# Patient Record
Sex: FEMALE | Race: WHITE | NOT HISPANIC OR LATINO | Employment: PART TIME | URBAN - METROPOLITAN AREA
[De-identification: names, ages, dates, MRNs, and addresses within clinical notes are randomized per-mention and may not be internally consistent; named-entity substitution may affect disease eponyms.]

---

## 2017-03-08 ENCOUNTER — WALK IN (OUTPATIENT)
Dept: URGENT CARE | Age: 27
End: 2017-03-08

## 2017-03-08 VITALS
DIASTOLIC BLOOD PRESSURE: 68 MMHG | HEART RATE: 78 BPM | TEMPERATURE: 98.6 F | SYSTOLIC BLOOD PRESSURE: 114 MMHG | WEIGHT: 196 LBS | RESPIRATION RATE: 18 BRPM

## 2017-03-08 DIAGNOSIS — R52 BODY ACHES: ICD-10-CM

## 2017-03-08 DIAGNOSIS — J02.9 SORE THROAT: ICD-10-CM

## 2017-03-08 DIAGNOSIS — R68.89 FLU-LIKE SYMPTOMS: Primary | ICD-10-CM

## 2017-03-08 PROCEDURE — 99203 OFFICE O/P NEW LOW 30 MIN: CPT | Performed by: INTERNAL MEDICINE

## 2017-03-08 RX ORDER — OSELTAMIVIR PHOSPHATE 75 MG/1
75 CAPSULE ORAL 2 TIMES DAILY
Qty: 10 CAPSULE | Refills: 0 | Status: SHIPPED | OUTPATIENT
Start: 2017-03-08

## 2017-06-16 ENCOUNTER — TELEPHONE (OUTPATIENT)
Dept: OBGYN CLINIC | Facility: CLINIC | Age: 27
End: 2017-06-16

## 2017-06-19 RX ORDER — NORGESTIMATE AND ETHINYL ESTRADIOL 0.25-0.035
1 KIT ORAL DAILY
Qty: 1 PACKAGE | Refills: 4 | Status: SHIPPED | OUTPATIENT
Start: 2017-06-19 | End: 2017-06-28

## 2017-06-28 RX ORDER — NORGESTIMATE AND ETHINYL ESTRADIOL 0.25-0.035
1 KIT ORAL DAILY
Qty: 1 PACKAGE | Refills: 4 | Status: SHIPPED | OUTPATIENT
Start: 2017-06-28 | End: 2018-01-29

## 2017-06-30 NOTE — TELEPHONE ENCOUNTER
Mailbox full, 5 packs of sprintec sent to pharm to get pt through until november annual. Caroline jimenes d/c'dennise.
PT NOTIFIED RX WAS SENT TO NEW PHARMACY. LEFT VOICE MAIL MESSAGE FOR THE LOCAL PHARMACY TO CANCEL RX.
PT REQUESTING TO HAVE HER B/C  CHANGED BACK TO THE PILLS INSTEAD OF NUVARING / PER PT STATE IT COST TOO MUCH / PLS ADV
Pharmacy up date it, pls send the rx to the pharmacy  on file
Pt calling to report that she is currently on NuvaRing and would like to switch back to [address] [place] [address] because the NuvaRing is costing her too much money.  Pt informed that MAF will be back on Monday and message will be sent to her to review and we will call back
Pt is returning call to nurse
Pt requesting rx transfer to [facility] in [place]. Pt informed that it comes up as [facility] in [facility], [facility]. #[phone]. Pt is not sure if this is the same mail order pharmacy.  Pt advised to call the pharmacy to ask if this is whe
Pt wants her medication sent to another pharmacy, fax [phone].  Please call pt with questions
Pt wants rx switched to another pharmacy. Informed pharmacy to have the rx faxed to the other pharmacy. Informed pt to call if she has any problems. Pt stated understanding.
This is fine-she can have enough to get her until her annual, which is due in November.        MAF
standing/walking

## 2017-11-22 ENCOUNTER — LAB SERVICES (OUTPATIENT)
Dept: OTHER | Age: 27
End: 2017-11-22

## 2017-11-22 ENCOUNTER — CHARTING TRANS (OUTPATIENT)
Dept: OTHER | Age: 27
End: 2017-11-22

## 2017-11-22 LAB — RAPID STREP GROUP A: NORMAL

## 2018-01-29 ENCOUNTER — OFFICE VISIT (OUTPATIENT)
Dept: OBGYN CLINIC | Facility: CLINIC | Age: 28
End: 2018-01-29

## 2018-01-29 VITALS
HEART RATE: 96 BPM | WEIGHT: 215 LBS | BODY MASS INDEX: 36.7 KG/M2 | HEIGHT: 64 IN | DIASTOLIC BLOOD PRESSURE: 78 MMHG | SYSTOLIC BLOOD PRESSURE: 116 MMHG

## 2018-01-29 DIAGNOSIS — Z76.0 MEDICATION REFILL: ICD-10-CM

## 2018-01-29 DIAGNOSIS — Z01.419 WELL WOMAN EXAM WITH ROUTINE GYNECOLOGICAL EXAM: Primary | ICD-10-CM

## 2018-01-29 PROCEDURE — 99395 PREV VISIT EST AGE 18-39: CPT | Performed by: CLINICAL NURSE SPECIALIST

## 2018-01-29 RX ORDER — NORGESTIMATE AND ETHINYL ESTRADIOL 0.25-0.035
1 KIT ORAL DAILY
Qty: 1 PACKAGE | Refills: 12 | Status: SHIPPED | OUTPATIENT
Start: 2018-01-29 | End: 2018-11-28

## 2018-01-29 NOTE — PROGRESS NOTES
Shayy Armijo is a 33year old female [facility] Patient's last menstrual period was 01/15/2018. Patient presents with:  Gyn Exam: ANNUAL EXAM / BCP REFILL  Last annual exam was 11/23/16. Last pap was 11/3/14 and was ASCUS, + HPV. Will repeat pap today.  Per Systems:  Constitutional:  Denies fatigue, night sweats, hot flashes  Eyes:  denies blurred or double vision  Cardiovascular:  denies chest pain or palpitations  Respiratory:  denies shortness of breath  Gastrointestinal:  denies heartburn, abdominal pain, Norgestimate-Eth Estradiol ([id number] [id number]) 0.25-35 MG-MCG Oral Tab; Take 1 tablet by mouth daily. Pap done today-reviewed new pap guidelines. Safe sexual practices and condom use urged.   Declines STD testing  Exercise and healthy lifestyle discusse

## 2018-10-04 ENCOUNTER — OFFICE VISIT (OUTPATIENT)
Dept: INTERNAL MEDICINE CLINIC | Facility: CLINIC | Age: 28
End: 2018-10-04
Payer: COMMERCIAL

## 2018-10-04 ENCOUNTER — LAB ENCOUNTER (OUTPATIENT)
Dept: LAB | Facility: HOSPITAL | Age: 28
End: 2018-10-04
Attending: PHYSICIAN ASSISTANT
Payer: COMMERCIAL

## 2018-10-04 VITALS
DIASTOLIC BLOOD PRESSURE: 80 MMHG | SYSTOLIC BLOOD PRESSURE: 118 MMHG | WEIGHT: 213.19 LBS | HEIGHT: 64 IN | HEART RATE: 89 BPM | BODY MASS INDEX: 36.4 KG/M2

## 2018-10-04 DIAGNOSIS — R10.9 RIGHT FLANK PAIN: ICD-10-CM

## 2018-10-04 DIAGNOSIS — R10.9 RIGHT FLANK PAIN: Primary | ICD-10-CM

## 2018-10-04 PROCEDURE — 81001 URINALYSIS AUTO W/SCOPE: CPT

## 2018-10-04 PROCEDURE — 87086 URINE CULTURE/COLONY COUNT: CPT

## 2018-10-04 PROCEDURE — 99212 OFFICE O/P EST SF 10 MIN: CPT | Performed by: PHYSICIAN ASSISTANT

## 2018-10-04 PROCEDURE — 99213 OFFICE O/P EST LOW 20 MIN: CPT | Performed by: PHYSICIAN ASSISTANT

## 2018-10-04 NOTE — PROGRESS NOTES
HPI:    Patient ID: Haleigh Jones is a 33year old female. HPI   Patient presents today complaining of right flank pain for nearly 2 weeks. At onset pain woke her from sleep and has been intermittent since then.   Pain is described as a \"stinging\" fee no distension and no mass. There is no hepatosplenomegaly. There is no tenderness. There is no rebound, no guarding and no CVA tenderness. Musculoskeletal: Normal range of motion. She exhibits no tenderness.    Lymphadenopathy:     She has no cervical thomas

## 2018-10-09 ENCOUNTER — HOSPITAL ENCOUNTER (OUTPATIENT)
Dept: ULTRASOUND IMAGING | Facility: HOSPITAL | Age: 28
Discharge: HOME | End: 2018-10-09
Attending: PHYSICIAN ASSISTANT
Payer: COMMERCIAL

## 2018-10-09 DIAGNOSIS — R10.9 RIGHT FLANK PAIN: ICD-10-CM

## 2018-10-09 PROCEDURE — 76770 US EXAM ABDO BACK WALL COMP: CPT | Performed by: PHYSICIAN ASSISTANT

## 2018-11-02 VITALS
HEART RATE: 87 BPM | WEIGHT: 200 LBS | RESPIRATION RATE: 16 BRPM | TEMPERATURE: 98.6 F | DIASTOLIC BLOOD PRESSURE: 70 MMHG | BODY MASS INDEX: 34.15 KG/M2 | SYSTOLIC BLOOD PRESSURE: 118 MMHG | HEIGHT: 64 IN | OXYGEN SATURATION: 98 %

## 2018-11-28 ENCOUNTER — OFFICE VISIT (OUTPATIENT)
Dept: OBGYN CLINIC | Facility: CLINIC | Age: 28
End: 2018-11-28
Payer: COMMERCIAL

## 2018-11-28 VITALS
WEIGHT: 213 LBS | SYSTOLIC BLOOD PRESSURE: 122 MMHG | BODY MASS INDEX: 37 KG/M2 | DIASTOLIC BLOOD PRESSURE: 82 MMHG | HEART RATE: 92 BPM

## 2018-11-28 DIAGNOSIS — Z01.419 WELL WOMAN EXAM WITH ROUTINE GYNECOLOGICAL EXAM: Primary | ICD-10-CM

## 2018-11-28 DIAGNOSIS — Z76.0 MEDICATION REFILL: ICD-10-CM

## 2018-11-28 PROCEDURE — 99395 PREV VISIT EST AGE 18-39: CPT | Performed by: CLINICAL NURSE SPECIALIST

## 2018-11-28 RX ORDER — NORGESTIMATE AND ETHINYL ESTRADIOL 0.25-0.035
1 KIT ORAL DAILY
Qty: 1 PACKAGE | Refills: 12 | Status: SHIPPED | OUTPATIENT
Start: 2018-11-28 | End: 2019-10-10

## 2018-11-30 NOTE — PROGRESS NOTES
Colton Bush is a 33year old female [facility] Patient's last menstrual period was 11/08/2018. Patient presents with:  Gyn Exam: Annual   Last annual and pap was in January of this year.  Has new insurance now and wants another annual. Pap was normal. No h Blood Transfusions: Not Asked        Caffeine Concern: Yes          1 cup of coffee daily        Occupational Exposure: Not Asked        Hobby Hazards: Not Asked        Sleep Concern: Not Asked        Stress Concern: Not Asked        Weight Concern: Not As changes  Abdomen:  soft, nontender, nondistended, no masses  Skin/Hair: no unusual rashes or bruises  Extremities: no edema, no cyanosis  Psychiatric:  Oriented to time, place, person and situation.  Appropriate mood and affect    Pelvic Exam:  External Gen

## 2019-09-02 DIAGNOSIS — Z76.0 MEDICATION REFILL: ICD-10-CM

## 2019-10-04 DIAGNOSIS — Z76.0 MEDICATION REFILL: ICD-10-CM

## 2019-10-10 DIAGNOSIS — Z76.0 MEDICATION REFILL: ICD-10-CM

## 2019-10-10 RX ORDER — NORGESTIMATE AND ETHINYL ESTRADIOL 0.25-0.035
1 KIT ORAL DAILY
Qty: 1 PACKAGE | Refills: 1 | OUTPATIENT
Start: 2019-10-10 | End: 2019-11-18

## 2019-10-10 RX ORDER — NORGESTIMATE AND ETHINYL ESTRADIOL 0.25-0.035
1 KIT ORAL DAILY
Qty: 1 PACKAGE | Refills: 12 | OUTPATIENT
Start: 2019-10-10

## 2019-11-18 ENCOUNTER — OFFICE VISIT (OUTPATIENT)
Dept: OBGYN CLINIC | Facility: CLINIC | Age: 29
End: 2019-11-18
Payer: COMMERCIAL

## 2019-11-18 VITALS
DIASTOLIC BLOOD PRESSURE: 85 MMHG | SYSTOLIC BLOOD PRESSURE: 135 MMHG | BODY MASS INDEX: 37.05 KG/M2 | WEIGHT: 222.38 LBS | HEART RATE: 84 BPM | HEIGHT: 65 IN

## 2019-11-18 DIAGNOSIS — Z01.419 WELL WOMAN EXAM WITH ROUTINE GYNECOLOGICAL EXAM: Primary | ICD-10-CM

## 2019-11-18 DIAGNOSIS — Z76.0 MEDICATION REFILL: ICD-10-CM

## 2019-11-18 PROCEDURE — 99395 PREV VISIT EST AGE 18-39: CPT | Performed by: CLINICAL NURSE SPECIALIST

## 2019-11-18 RX ORDER — NORGESTIMATE AND ETHINYL ESTRADIOL 0.25-0.035
1 KIT ORAL DAILY
Qty: 3 PACKAGE | Refills: 4 | Status: SHIPPED | OUTPATIENT
Start: 2019-11-18 | End: 2021-01-27

## 2019-11-18 NOTE — PROGRESS NOTES
Suhail Ronquillo is a 30year old female [facility] Patient's last menstrual period was 11/05/2019. Patient presents with:  Gyn Exam: Annual  Medication Request: OCP REFILL  Last annual exam and pap was last year.  Pap was normal. Hx of ASCUS, + HPV in 2014 but Stress: Not on file    Relationships      Social connections:        Talks on phone: Not on file        Gets together: Not on file        Attends Sabianist service: Not on file        Active member of club or organization: Not on file        Attends meetin breast pain, lumps, or discharge. Neurological:  denies headaches, extremity weakness or numbness. Psychiatric: denies depression or anxiety. Endocrine:   denies excessive thirst or urination.   Heme/Lymph:  denies history of anemia, easy bruising or bl OCP sent  Monthly self breast exams.    Yearly exams encouraged

## 2020-01-21 ENCOUNTER — OFFICE VISIT (OUTPATIENT)
Dept: INTERNAL MEDICINE CLINIC | Facility: CLINIC | Age: 30
End: 2020-01-21
Payer: COMMERCIAL

## 2020-01-21 ENCOUNTER — TELEPHONE (OUTPATIENT)
Dept: INTERNAL MEDICINE CLINIC | Facility: CLINIC | Age: 30
End: 2020-01-21

## 2020-01-21 VITALS
SYSTOLIC BLOOD PRESSURE: 130 MMHG | WEIGHT: 221.88 LBS | HEIGHT: 65 IN | BODY MASS INDEX: 36.97 KG/M2 | RESPIRATION RATE: 20 BRPM | OXYGEN SATURATION: 98 % | DIASTOLIC BLOOD PRESSURE: 87 MMHG | HEART RATE: 111 BPM | TEMPERATURE: 99 F

## 2020-01-21 DIAGNOSIS — R05.9 COUGH: Primary | ICD-10-CM

## 2020-01-21 LAB
FLUAV + FLUBV RNA SPEC NAA+PROBE: NEGATIVE
FLUAV + FLUBV RNA SPEC NAA+PROBE: NEGATIVE
FLUAV + FLUBV RNA SPEC NAA+PROBE: POSITIVE

## 2020-01-21 PROCEDURE — 99213 OFFICE O/P EST LOW 20 MIN: CPT | Performed by: INTERNAL MEDICINE

## 2020-01-21 RX ORDER — OSELTAMIVIR PHOSPHATE 75 MG/1
75 CAPSULE ORAL 2 TIMES DAILY
Qty: 10 CAPSULE | Refills: 0 | Status: SHIPPED | OUTPATIENT
Start: 2020-01-21 | End: 2020-10-23 | Stop reason: ALTCHOICE

## 2020-01-21 NOTE — PROGRESS NOTES
HPI:    Patient ID: Shayy Armijo is a 30year old female. Patient presents with: Body ache and/or chills: Pt state that she has bodyache that started yesterday with fever, dry cough. Cough   This is a new problem.  The current episode started yes She appears well-developed and well-nourished. No distress. HENT:   Head: Normocephalic and atraumatic.    Right Ear: Tympanic membrane, external ear and ear canal normal.   Left Ear: Tympanic membrane, external ear and ear canal normal.   Nose: Nose norm days sent to the pharmacy patient will be off work today tomorrow  If flu swab negative patient can go back and feeling well on Thursday she will let me know  Increase water intake rest vitamin C  Patient education  Off work today and tomorrow      No orde

## 2020-01-21 NOTE — TELEPHONE ENCOUNTER
Discussed with patient patient was placed on the Tamiflu   she started taking it will stay home to 3 days    -To rest fluids patient  , Verbalized understanding compliance-we will call me if any persistent or worsening symptoms or go to ER

## 2020-09-17 DIAGNOSIS — Z76.0 MEDICATION REFILL: ICD-10-CM

## 2020-09-17 RX ORDER — NORGESTIMATE AND ETHINYL ESTRADIOL 0.25-0.035
1 KIT ORAL DAILY
Qty: 3 PACKAGE | Refills: 4 | OUTPATIENT
Start: 2020-09-17

## 2020-09-29 DIAGNOSIS — Z76.0 MEDICATION REFILL: ICD-10-CM

## 2020-09-29 RX ORDER — NORGESTIMATE AND ETHINYL ESTRADIOL 0.25-0.035
1 KIT ORAL DAILY
Qty: 3 PACKAGE | Refills: 4 | OUTPATIENT
Start: 2020-09-29

## 2020-10-23 ENCOUNTER — TELEPHONE (OUTPATIENT)
Dept: INTERNAL MEDICINE CLINIC | Facility: CLINIC | Age: 30
End: 2020-10-23

## 2020-10-23 NOTE — TELEPHONE ENCOUNTER
Medication list updated.      Michael Gail  You 36 minutes ago (12:49 PM)        Mikal Rothman,     Yes I would like to remove that medication from my list. The only medication I am taking is the [facility] birth control.     Thanks,  Lanre Gamez

## 2020-10-23 NOTE — TELEPHONE ENCOUNTER
Patient Review of Clinical Information      Problems    The patient or proxy has not reviewed this information.            Medications    The patient or proxy has not reviewed this information, and there are updates pending:     Requested Medication Removal

## 2021-01-27 ENCOUNTER — OFFICE VISIT (OUTPATIENT)
Dept: OBGYN CLINIC | Facility: CLINIC | Age: 31
End: 2021-01-27
Payer: COMMERCIAL

## 2021-01-27 VITALS
DIASTOLIC BLOOD PRESSURE: 82 MMHG | BODY MASS INDEX: 37 KG/M2 | HEART RATE: 84 BPM | SYSTOLIC BLOOD PRESSURE: 126 MMHG | WEIGHT: 224 LBS

## 2021-01-27 DIAGNOSIS — Z01.419 WELL WOMAN EXAM WITH ROUTINE GYNECOLOGICAL EXAM: Primary | ICD-10-CM

## 2021-01-27 DIAGNOSIS — Z76.0 MEDICATION REFILL: ICD-10-CM

## 2021-01-27 PROCEDURE — 99395 PREV VISIT EST AGE 18-39: CPT | Performed by: CLINICAL NURSE SPECIALIST

## 2021-01-27 PROCEDURE — 3079F DIAST BP 80-89 MM HG: CPT | Performed by: CLINICAL NURSE SPECIALIST

## 2021-01-27 PROCEDURE — 3074F SYST BP LT 130 MM HG: CPT | Performed by: CLINICAL NURSE SPECIALIST

## 2021-01-27 RX ORDER — NORGESTIMATE AND ETHINYL ESTRADIOL 0.25-0.035
1 KIT ORAL DAILY
Qty: 3 PACKAGE | Refills: 4 | Status: SHIPPED | OUTPATIENT
Start: 2021-01-27 | End: 2022-01-31

## 2021-01-30 NOTE — PROGRESS NOTES
Phil Bass is a 35year old female [facility] Patient's last menstrual period was 01/09/2021. Patient presents with:  Gyn Exam: Annual exam   Last annual exam was 2019. Last pap was 2018 and normal. Will do co-testing next year.  Periods are regular with file        Gets together: Not on file        Attends Voodoo service: Not on file        Active member of club or organization: Not on file        Attends meetings of clubs or organizations: Not on file        Relationship status: Not on file      Intim numbness. Psychiatric: denies depression or anxiety. Endocrine:   denies excessive thirst or urination. Heme/Lymph:  denies history of anemia, easy bruising or bleeding.       PHYSICAL EXAM:   /82   Pulse 84   Wt 224 lb (101.6 kg)   LMP 01/09/2021

## 2021-09-13 ENCOUNTER — TELEPHONE (OUTPATIENT)
Dept: ORTHOPEDICS CLINIC | Facility: CLINIC | Age: 31
End: 2021-09-13

## 2021-09-13 DIAGNOSIS — M25.551 HIP PAIN, RIGHT: Primary | ICD-10-CM

## 2021-09-13 NOTE — TELEPHONE ENCOUNTER
Xrays ordered. Pt called and notified tohave those completed before her visit with Dr. Reema Booth. Pt verbalized understanding. No further questions at this time.

## 2021-09-13 NOTE — TELEPHONE ENCOUNTER
Future Appointments   Date Time Provider Ava Heath   10/5/2021  4:20 PM Alisha Orosco MD [facility]       Patient is coming for RT Hip Pain. No imaging done yet. Do we need views for patient's appt. ? Please advise. Thanks.     Leticia

## 2022-01-31 ENCOUNTER — OFFICE VISIT (OUTPATIENT)
Dept: OBGYN CLINIC | Facility: CLINIC | Age: 32
End: 2022-01-31
Payer: COMMERCIAL

## 2022-01-31 VITALS
SYSTOLIC BLOOD PRESSURE: 125 MMHG | HEART RATE: 76 BPM | HEIGHT: 64 IN | DIASTOLIC BLOOD PRESSURE: 82 MMHG | WEIGHT: 194 LBS | BODY MASS INDEX: 33.12 KG/M2

## 2022-01-31 DIAGNOSIS — Z01.419 WELL WOMAN EXAM WITH ROUTINE GYNECOLOGICAL EXAM: Primary | ICD-10-CM

## 2022-01-31 DIAGNOSIS — Z76.0 MEDICATION REFILL: ICD-10-CM

## 2022-01-31 DIAGNOSIS — Z12.4 CERVICAL CANCER SCREENING: ICD-10-CM

## 2022-01-31 PROCEDURE — 3074F SYST BP LT 130 MM HG: CPT | Performed by: CLINICAL NURSE SPECIALIST

## 2022-01-31 PROCEDURE — 3008F BODY MASS INDEX DOCD: CPT | Performed by: CLINICAL NURSE SPECIALIST

## 2022-01-31 PROCEDURE — 99395 PREV VISIT EST AGE 18-39: CPT | Performed by: CLINICAL NURSE SPECIALIST

## 2022-01-31 PROCEDURE — 3079F DIAST BP 80-89 MM HG: CPT | Performed by: CLINICAL NURSE SPECIALIST

## 2022-01-31 RX ORDER — NORGESTIMATE AND ETHINYL ESTRADIOL 0.25-0.035
1 KIT ORAL DAILY
Qty: 84 TABLET | Refills: 4 | Status: SHIPPED | OUTPATIENT
Start: 2022-01-31

## 2022-02-01 LAB — HPV I/H RISK 1 DNA SPEC QL NAA+PROBE: NEGATIVE

## 2022-02-01 NOTE — PROGRESS NOTES
Rachael Hayden is a 28year old female [facility] Patient's last menstrual period was 01/25/2022. Patient presents with:  Gyn Exam: ANNUAL EXAM  Last annual exam was 2021. Last pap was 2018 and normal. Will do co-testing today.  Periods are regular with OCP a Seat Belt: Not Asked        Self-Exams: Not Asked    Social History Narrative      Not on file    Social Determinants of Health  Financial Resource Strain: Not on file  Food Insecurity: Not on file  Transportation Needs: Not on file  Physical Activity: Not asymmetry, nipple discharge, nipple retraction or skin changes  Abdomen:  soft, nontender, nondistended, no masses  Skin/Hair: no unusual rashes or bruises  Extremities: no edema, no cyanosis  Psychiatric:  Oriented to time, place, person and situation.  Ap

## 2022-02-12 LAB — LAST PAP RESULT: NORMAL

## 2023-12-26 ENCOUNTER — APPOINTMENT (OUTPATIENT)
Dept: URGENT CARE | Age: 33
End: 2023-12-26

## (undated) NOTE — MR AVS SNAPSHOT
After Visit Summary   1/31/2022    Saul Jay   MRN: [id number]           Visit Information     Date & Time  1/31/2022  6:20 PM Provider  Aury Garcia, [address] [place] [address], [address] [place] [address],[address], [facility]/[employer].  Phone  [address], please take a few minutes to complete it and provide feedback. We strive to deliver the best patient experience and are looking for ways to make improvements. Your feedback will help us do so. For more information on [employer], please visit [email or web address]

## (undated) NOTE — LETTER
1/21/2020          To Whom It May Concern:    Alex German was seen in the office today and will be off for 2 days. Mert Gay will return back to work on 01/23/2020. If you require additional information please contact our office.         Sincerely,    Nancy